# Patient Record
Sex: FEMALE | Race: ASIAN | NOT HISPANIC OR LATINO | URBAN - METROPOLITAN AREA
[De-identification: names, ages, dates, MRNs, and addresses within clinical notes are randomized per-mention and may not be internally consistent; named-entity substitution may affect disease eponyms.]

---

## 2019-04-28 ENCOUNTER — EMERGENCY (EMERGENCY)
Facility: HOSPITAL | Age: 64
LOS: 1 days | Discharge: HOME | End: 2019-04-28
Attending: EMERGENCY MEDICINE | Admitting: EMERGENCY MEDICINE
Payer: MEDICAID

## 2019-04-28 VITALS
DIASTOLIC BLOOD PRESSURE: 73 MMHG | SYSTOLIC BLOOD PRESSURE: 130 MMHG | RESPIRATION RATE: 18 BRPM | HEART RATE: 64 BPM | OXYGEN SATURATION: 100 % | TEMPERATURE: 98 F

## 2019-04-28 LAB
ALBUMIN SERPL ELPH-MCNC: 4.1 G/DL — SIGNIFICANT CHANGE UP (ref 3.3–5)
ALP SERPL-CCNC: 64 U/L — SIGNIFICANT CHANGE UP (ref 40–120)
ALT FLD-CCNC: 13 U/L — SIGNIFICANT CHANGE UP (ref 4–33)
AMPHET UR-MCNC: NEGATIVE — SIGNIFICANT CHANGE UP
ANION GAP SERPL CALC-SCNC: 13 MMO/L — SIGNIFICANT CHANGE UP (ref 7–14)
APAP SERPL-MCNC: < 15 UG/ML — LOW (ref 15–25)
APPEARANCE UR: CLEAR — SIGNIFICANT CHANGE UP
AST SERPL-CCNC: 15 U/L — SIGNIFICANT CHANGE UP (ref 4–32)
BARBITURATES UR SCN-MCNC: NEGATIVE — SIGNIFICANT CHANGE UP
BASOPHILS # BLD AUTO: 0.02 K/UL — SIGNIFICANT CHANGE UP (ref 0–0.2)
BASOPHILS NFR BLD AUTO: 0.3 % — SIGNIFICANT CHANGE UP (ref 0–2)
BENZODIAZ UR-MCNC: NEGATIVE — SIGNIFICANT CHANGE UP
BILIRUB SERPL-MCNC: 0.4 MG/DL — SIGNIFICANT CHANGE UP (ref 0.2–1.2)
BILIRUB UR-MCNC: NEGATIVE — SIGNIFICANT CHANGE UP
BLOOD UR QL VISUAL: NEGATIVE — SIGNIFICANT CHANGE UP
BUN SERPL-MCNC: 9 MG/DL — SIGNIFICANT CHANGE UP (ref 7–23)
CALCIUM SERPL-MCNC: 9.5 MG/DL — SIGNIFICANT CHANGE UP (ref 8.4–10.5)
CANNABINOIDS UR-MCNC: NEGATIVE — SIGNIFICANT CHANGE UP
CHLORIDE SERPL-SCNC: 102 MMOL/L — SIGNIFICANT CHANGE UP (ref 98–107)
CO2 SERPL-SCNC: 24 MMOL/L — SIGNIFICANT CHANGE UP (ref 22–31)
COCAINE METAB.OTHER UR-MCNC: NEGATIVE — SIGNIFICANT CHANGE UP
COLOR SPEC: SIGNIFICANT CHANGE UP
CREAT SERPL-MCNC: 0.62 MG/DL — SIGNIFICANT CHANGE UP (ref 0.5–1.3)
EOSINOPHIL # BLD AUTO: 0.07 K/UL — SIGNIFICANT CHANGE UP (ref 0–0.5)
EOSINOPHIL NFR BLD AUTO: 1.1 % — SIGNIFICANT CHANGE UP (ref 0–6)
ETHANOL BLD-MCNC: < 10 MG/DL — SIGNIFICANT CHANGE UP
GLUCOSE SERPL-MCNC: 96 MG/DL — SIGNIFICANT CHANGE UP (ref 70–99)
GLUCOSE UR-MCNC: NEGATIVE — SIGNIFICANT CHANGE UP
HCT VFR BLD CALC: 41.5 % — SIGNIFICANT CHANGE UP (ref 34.5–45)
HGB BLD-MCNC: 13.3 G/DL — SIGNIFICANT CHANGE UP (ref 11.5–15.5)
IMM GRANULOCYTES NFR BLD AUTO: 0.3 % — SIGNIFICANT CHANGE UP (ref 0–1.5)
KETONES UR-MCNC: NEGATIVE — SIGNIFICANT CHANGE UP
LEUKOCYTE ESTERASE UR-ACNC: NEGATIVE — SIGNIFICANT CHANGE UP
LIDOCAIN IGE QN: 43.9 U/L — SIGNIFICANT CHANGE UP (ref 7–60)
LYMPHOCYTES # BLD AUTO: 2.37 K/UL — SIGNIFICANT CHANGE UP (ref 1–3.3)
LYMPHOCYTES # BLD AUTO: 38.4 % — SIGNIFICANT CHANGE UP (ref 13–44)
MCHC RBC-ENTMCNC: 29.3 PG — SIGNIFICANT CHANGE UP (ref 27–34)
MCHC RBC-ENTMCNC: 32 % — SIGNIFICANT CHANGE UP (ref 32–36)
MCV RBC AUTO: 91.4 FL — SIGNIFICANT CHANGE UP (ref 80–100)
METHADONE UR-MCNC: NEGATIVE — SIGNIFICANT CHANGE UP
MONOCYTES # BLD AUTO: 0.52 K/UL — SIGNIFICANT CHANGE UP (ref 0–0.9)
MONOCYTES NFR BLD AUTO: 8.4 % — SIGNIFICANT CHANGE UP (ref 2–14)
NEUTROPHILS # BLD AUTO: 3.17 K/UL — SIGNIFICANT CHANGE UP (ref 1.8–7.4)
NEUTROPHILS NFR BLD AUTO: 51.5 % — SIGNIFICANT CHANGE UP (ref 43–77)
NITRITE UR-MCNC: NEGATIVE — SIGNIFICANT CHANGE UP
NRBC # FLD: 0 K/UL — SIGNIFICANT CHANGE UP (ref 0–0)
OPIATES UR-MCNC: NEGATIVE — SIGNIFICANT CHANGE UP
OXYCODONE UR-MCNC: NEGATIVE — SIGNIFICANT CHANGE UP
PCP UR-MCNC: NEGATIVE — SIGNIFICANT CHANGE UP
PH UR: 7 — SIGNIFICANT CHANGE UP (ref 5–8)
PLATELET # BLD AUTO: 391 K/UL — SIGNIFICANT CHANGE UP (ref 150–400)
PMV BLD: 9.6 FL — SIGNIFICANT CHANGE UP (ref 7–13)
POTASSIUM SERPL-MCNC: 3.8 MMOL/L — SIGNIFICANT CHANGE UP (ref 3.5–5.3)
POTASSIUM SERPL-SCNC: 3.8 MMOL/L — SIGNIFICANT CHANGE UP (ref 3.5–5.3)
PROT SERPL-MCNC: 7.3 G/DL — SIGNIFICANT CHANGE UP (ref 6–8.3)
PROT UR-MCNC: NEGATIVE — SIGNIFICANT CHANGE UP
RBC # BLD: 4.54 M/UL — SIGNIFICANT CHANGE UP (ref 3.8–5.2)
RBC # FLD: 12.9 % — SIGNIFICANT CHANGE UP (ref 10.3–14.5)
SALICYLATES SERPL-MCNC: < 5 MG/DL — LOW (ref 15–30)
SODIUM SERPL-SCNC: 139 MMOL/L — SIGNIFICANT CHANGE UP (ref 135–145)
SP GR SPEC: 1.01 — SIGNIFICANT CHANGE UP (ref 1–1.04)
TSH SERPL-MCNC: 1.21 UIU/ML — SIGNIFICANT CHANGE UP (ref 0.27–4.2)
UROBILINOGEN FLD QL: NORMAL — SIGNIFICANT CHANGE UP
WBC # BLD: 6.17 K/UL — SIGNIFICANT CHANGE UP (ref 3.8–10.5)
WBC # FLD AUTO: 6.17 K/UL — SIGNIFICANT CHANGE UP (ref 3.8–10.5)

## 2019-04-28 PROCEDURE — 74177 CT ABD & PELVIS W/CONTRAST: CPT | Mod: 26

## 2019-04-28 PROCEDURE — 99284 EMERGENCY DEPT VISIT MOD MDM: CPT

## 2019-04-28 RX ORDER — MECLIZINE HCL 12.5 MG
25 TABLET ORAL ONCE
Qty: 0 | Refills: 0 | Status: COMPLETED | OUTPATIENT
Start: 2019-04-28 | End: 2019-04-28

## 2019-04-28 RX ORDER — SODIUM CHLORIDE 9 MG/ML
1000 INJECTION INTRAMUSCULAR; INTRAVENOUS; SUBCUTANEOUS ONCE
Qty: 0 | Refills: 0 | Status: COMPLETED | OUTPATIENT
Start: 2019-04-28 | End: 2019-04-28

## 2019-04-28 RX ORDER — METOCLOPRAMIDE HCL 10 MG
10 TABLET ORAL ONCE
Qty: 0 | Refills: 0 | Status: COMPLETED | OUTPATIENT
Start: 2019-04-28 | End: 2019-04-28

## 2019-04-28 RX ORDER — LANOLIN ALCOHOL/MO/W.PET/CERES
1 CREAM (GRAM) TOPICAL
Qty: 14 | Refills: 0 | OUTPATIENT
Start: 2019-04-28 | End: 2019-05-11

## 2019-04-28 RX ORDER — ONDANSETRON 8 MG/1
4 TABLET, FILM COATED ORAL ONCE
Qty: 0 | Refills: 0 | Status: COMPLETED | OUTPATIENT
Start: 2019-04-28 | End: 2019-04-28

## 2019-04-28 RX ORDER — MECLIZINE HCL 12.5 MG
1 TABLET ORAL
Qty: 21 | Refills: 0 | OUTPATIENT
Start: 2019-04-28 | End: 2019-05-04

## 2019-04-28 RX ORDER — METOCLOPRAMIDE HCL 10 MG
1 TABLET ORAL
Qty: 28 | Refills: 0 | OUTPATIENT
Start: 2019-04-28 | End: 2019-05-04

## 2019-04-28 RX ADMIN — ONDANSETRON 4 MILLIGRAM(S): 8 TABLET, FILM COATED ORAL at 18:20

## 2019-04-28 RX ADMIN — SODIUM CHLORIDE 2000 MILLILITER(S): 9 INJECTION INTRAMUSCULAR; INTRAVENOUS; SUBCUTANEOUS at 18:04

## 2019-04-28 RX ADMIN — SODIUM CHLORIDE 1000 MILLILITER(S): 9 INJECTION INTRAMUSCULAR; INTRAVENOUS; SUBCUTANEOUS at 19:21

## 2019-04-28 RX ADMIN — Medication 25 MILLIGRAM(S): at 18:03

## 2019-04-28 RX ADMIN — Medication 10 MILLIGRAM(S): at 20:43

## 2019-04-28 NOTE — ED PROVIDER NOTE - PROGRESS NOTE DETAILS
Zhang att: Patient re-evaluated, dizziness abated with meclizine, nausea not improved. Reglan written. Zafar reports xavier-umbilical abd pain. CT ordered. Daughter Cheryle updated. Zhang att: Nausea improved, patient now declines CT, prefers home and see PMD. Dc papers below. Kaitlin: patient changed her mind after speaking with her daughter, will stay for CT a/p. reordered. Kaitlin: CT with no acute findings. will dc home.

## 2019-04-28 NOTE — ED PROVIDER NOTE - CLINICAL SUMMARY MEDICAL DECISION MAKING FREE TEXT BOX
63y female presenting with insomnia, nausea and dizziness.  DDx: anemia, electrolyte abnormality, BPPV.  No focal neuro deficits do not think stroke.  Will get labs, give fluids, meclezine and zofran. 63y female presenting with insomnia, nausea and dizziness.  DDx: anemia, electrolyte abnormality, BPPV.  No focal neuro deficits do not think stroke.  Will get labs, give fluids, meclezine and zofran.  (1) Dizziness and nausea with intact cerebellar and cranial nerve exam DDX bppv PLAN meclizine, zofran, orthostatics, fluid cbc r/o anemia  (2) Insomnia eval for thyroid dysfunction, dc melatonin or benadryl and referral to crisis center

## 2019-04-28 NOTE — ED ADULT NURSE NOTE - NSIMPLEMENTINTERV_GEN_ALL_ED
Implemented All Fall Risk Interventions:  Ida to call system. Call bell, personal items and telephone within reach. Instruct patient to call for assistance. Room bathroom lighting operational. Non-slip footwear when patient is off stretcher. Physically safe environment: no spills, clutter or unnecessary equipment. Stretcher in lowest position, wheels locked, appropriate side rails in place. Provide visual cue, wrist band, yellow gown, etc. Monitor gait and stability. Monitor for mental status changes and reorient to person, place, and time. Review medications for side effects contributing to fall risk. Reinforce activity limits and safety measures with patient and family.

## 2019-04-28 NOTE — ED PROVIDER NOTE - OBJECTIVE STATEMENT
17:22 Zhang att: 63F brought in by family for insomnia x 2-2.5 wks. Past 2.5 weeks patient having trouble sleeping, less appetite, less energy. At times feels both lightheaded, room spinning, and nausea. Notes slight cough, no dysuria. [place] ago [place] At present notes dry spit  PMH head trauma 2 years ago [facility] MED ambien 5 mg qhs (no help) ALL SMOKE PCP 17:22 Zhang att: [place]  #[id number] 63F brought in by family for insomnia x 2-2.5 wks. Past 2.5 weeks while in [place] patient having trouble sleeping, less appetite, less energy. At times feels both lightheaded, room spinning, and nausea. Notes slight cough, no dysuria. Denies visual disturbance, aphasia, dysphagia, ataxia, or focal motor or sensory disturbance of face arm or leg. Nine days ago moved from [place] to [facility]. [place]CP prescribed Ambien 5 mg po qhs but the pills don't help with sleep, make her nauseous, and she wakes up foggy and dry mouth. Denies si/hi/ah. PMH head trauma 2 years ago P[facility] MED ambien 5 mg qhs (no help) ALL SMOKE PCP

## 2019-04-28 NOTE — ED ADULT TRIAGE NOTE - CHIEF COMPLAINT QUOTE
PT C/O Dizziness, Nausea, inability to sleep for approximately 1.5 weeks. Pt returned from [place] 9 days ago states symptoms were ongoing while in [place]. Denies fevers, chills, cough, recent illness, PMH. Swedish  used for HPI. EKG in progress.

## 2019-04-28 NOTE — ED PROVIDER NOTE - NEUROLOGICAL, MLM
Alert and oriented, no focal deficits, no motor or sensory deficits. Coordination with finger to nose intact, heel to shin intact bilaterally.  No gait abnormalities

## 2019-04-28 NOTE — ED ADULT NURSE REASSESSMENT NOTE - NS ED NURSE REASSESS COMMENT FT1
returned from break coverage, pt alert and speaking with family, orthostatic completed, denies dizziness, n/v/cp

## 2019-04-28 NOTE — ED PROVIDER NOTE - ATTENDING CONTRIBUTION TO CARE
Dr. Holcomb: I have personally seen and examined this patient at the bedside. I have fully participated in the care of this patient. I have reviewed all pertinent clinical information, including history, physical exam, plan and the Resident's note and agree except as noted. HPI above as by me. PE above as by me. (1) Dizziness and nausea with intact cerebellar and cranial nerve exam DDX bppv PLAN meclizine, zofran, orthostatics, fluid cbc r/o anemia (2) Insomnia eval for thyroid dysfunction, dc melatonin or benadryl and referral to crisis center

## 2019-04-28 NOTE — ED PROVIDER NOTE - PHYSICAL EXAMINATION
Zhang att: nad, aaox3, ctabl, rrr, s1s2, abd soft ntnd, neg le edema. CN 2-12 intact, neg pronator drift, 5/5 str throughout, sensation intact throughout, gait steady, clear speech. Neg nystagmus. Nl finger nose. Nl rapid alt movement. Nl heel shin. Nl gait. Neg romberg.

## 2019-04-28 NOTE — ED ADULT NURSE NOTE - CHIEF COMPLAINT QUOTE
PT C/O Dizziness, Nausea, inability to sleep for approximately 1.5 weeks. Pt returned from [place] 9 days ago states symptoms were ongoing while in [place]. Denies fevers, chills, cough, recent illness, PMH. Albanian  used for HPI. EKG in progress.

## 2019-04-28 NOTE — ED PROVIDER NOTE - NSFOLLOWUPINSTRUCTIONS_ED_ALL_ED_FT
Seen in ER for insomnia, nausea, and dizziness. (1) For sleeping trouble. please stop ambien. For the insomnia (inability to sleep) please take Benadryl 25-50 mg at night OR Melatonin 3-5 mg at night to help you fall and stay asleep. Benadryl may make you more dry mouth and cause morning drowsiness like the pill you used to take. Melatonin is natural and produced in the body but may cause nightmare. (2) For your nausea please take Reglan 1 tab every 6 hours as needed for nausea. See your primary doctor for further follow up care. (3) If you are dizzy please take Meclizine 25 mg one tab every 8 hours as needed. Return to ER for any new or worsening symptoms.

## 2019-04-28 NOTE — ED ADULT NURSE REASSESSMENT NOTE - NS ED NURSE REASSESS COMMENT FT1
Received report from mayito RN Ally CHURCHILL. Pt AOx4, ambulatory, pt c/o dizziness at this time, pt medicated as ordered, vitals as charted, breathing even and unlabored, waiting on CT, will continue to monitor.

## 2019-04-28 NOTE — ED ADULT NURSE NOTE - OBJECTIVE STATEMENT
Pt aa&ox3 primarily Faroese speaking ( phone used) [place]  #[id number] p/w insomnia x 2-2.5 wks. Pt states while in [place] for 2 weeks she was having trouble sleeping, less appetite, less energy. Pt states at times she feels both lightheaded, room spinning, and nausea. Notes slight cough, no dysuria. Denies visual disturbance, aphasia, dysphagia, ataxia, or focal motor or sensory disturbance of face arm or leg. [place]CP prescribed Ambien 5 mg po qhs but the pills don't help with sleep, make her nauseous, and she wakes up foggy and dry mouth. Denies si/hi/ah. 20g IV placed in Rt AC, labs sent. Will monitor.

## 2019-04-29 VITALS
HEART RATE: 65 BPM | DIASTOLIC BLOOD PRESSURE: 64 MMHG | TEMPERATURE: 98 F | RESPIRATION RATE: 18 BRPM | OXYGEN SATURATION: 97 % | SYSTOLIC BLOOD PRESSURE: 125 MMHG

## 2019-04-30 LAB
BACTERIA UR CULT: SIGNIFICANT CHANGE UP
SPECIMEN SOURCE: SIGNIFICANT CHANGE UP

## 2022-02-11 PROBLEM — Z00.00 ENCOUNTER FOR PREVENTIVE HEALTH EXAMINATION: Status: ACTIVE | Noted: 2022-02-11

## 2022-02-18 ENCOUNTER — NON-APPOINTMENT (OUTPATIENT)
Age: 67
End: 2022-02-18

## 2022-02-18 ENCOUNTER — APPOINTMENT (OUTPATIENT)
Dept: ORTHOPEDIC SURGERY | Facility: CLINIC | Age: 67
End: 2022-02-18
Payer: MEDICARE

## 2022-02-18 VITALS
WEIGHT: 110 LBS | BODY MASS INDEX: 19.49 KG/M2 | HEIGHT: 62.99 IN | DIASTOLIC BLOOD PRESSURE: 71 MMHG | SYSTOLIC BLOOD PRESSURE: 107 MMHG | OXYGEN SATURATION: 95 % | HEART RATE: 79 BPM

## 2022-02-18 PROCEDURE — 99204 OFFICE O/P NEW MOD 45 MIN: CPT

## 2022-02-18 PROCEDURE — 73030 X-RAY EXAM OF SHOULDER: CPT | Mod: RT

## 2022-02-18 NOTE — PHYSICAL EXAM
[de-identified] : Physical Examination\par General: well nourished, in no acute distress, alert and oriented to person, place and time\par Psychiatric: normal mood and affect, no abnormal movements or speech patterns\par Eyes: vision intact [-] glasses\par \par Musculoskeletal Examination\par Cervical spine	Full painless range of motion and negative Spurling's test\par \par Shoulder			Right			Left\par Appearance\par      Skin/Swelling/Deformity	normal			normal\par      Scapular Winging		-			-\par Range of Motion\par      Forward Flexion		80 / 80		170 / 170\par      Abduction			60 / 60		170 / 170\par      External Rotation		15			80\par      Internal Rotation		lateral hip			T10\par      SAbd Ext Rotation		90&			90\par      SAbd Int Rotation		80&			80\par      Painful Arc			+			-\par      Crepitus			-			-\par Palpation\par      Clavicle			-			-\par      AC Joint			-			-\par      Posterior Acromion		-			-\par      Levator Scapula		-			-\par      Lateral Bursa			+			-\par      Impingement Area		-			-\par      Biceps Tendon		-			-\par      Anterior Capsule		-			-\par Strength Examination\par      Supraspinatous 		5+ / +			5+ / 0\par      Infraspinatous			5+ / +			5+ / 0\par      Subscapularis			5+ / 0			5+ / 0\par      Belly Press			5+ / 0			5+ / 0\par      Lift Off			-&			-\par      Drop-Arm			-			-\par Special Examination\par      Biceps Dragoon's		+			-\par      Impingement Neer		-			-\par      Impingement Hawking		-			-\par \par Sensation\par      Axillary			normal			normal\par      LatAntCubBrach 		normal			normal\par      Median 			normal			normal\par      Ulnar 			normal			normal\par      Radial 			normal			normal\par Motor\par      AIN 				normal			normal\par      Ulnar 			normal			normal\par      Radial 			normal			normal\par      PIN 				normal			normal\par Pulses\par      Radial			2+			2+ [de-identified] : 4 views of the affected Right shoulder (AP, Glenoid, Y-View, Axillary)\par were ordered, obtained and evaluated by myself today and\par demonstrate:\par normal bony calcification without dislocation and no fracture\par 	Arch	2B\par 	AC Joint	no Arthrosis\par 	GH Joint	no Arthrosis\par 	Calcifications	none\par \par MRI right shoulder from [facility] on 2-14-22\par My impression of the images:\par Quality of the MRI is ok\par Supraspinatous Tendon low grade undersurface tear with tendinosis\par Infraspinatous Tendon tendinosis\par Subscapularis Tendon ok\par Teres Minor Tendon ok\par Muscle Belly Atrophy none\par Biceps Tendon is  in the groove and looks ok intra-articularly but poorly visualized with a stable attachment anchor\par Superior Labrum ok\par Anterior Labrum ok\par Posterior Labrum ok\par AC joint ok\par There is no full thickness chondral lesion of the glenoid and humeral head\par \par The Final Radiologist Impression:\par Partial-thickness undersurface tearing of the supraspinatus tendon involving greater than 50% of the tendon thickness mild infraspinatus tendinosis\par \par Thickening and increased signal in the anterior joint capsule and edema within the rotator cuff interval, finding suggestive of adhesive capsulitis\par A.c. joint arthrosis\par

## 2022-02-18 NOTE — HISTORY OF PRESENT ILLNESS
[de-identified] : Chief Complaint R shoulder pain\par \par HPI: JOSE EDUARDO The patient is a 66 year yo RHD female presents today for 2 months constant pain to the anterior right shoulder after reaching for overhead item and after shoveling snow. \par  she says the pain is worse, rating the pain a 9 out of 10, and describes the character of the pain as sharp, stabbing, without radiation.\par she reports associated symptoms of loss of motion, stiffness. \par she reports nothing makes the pain better and internal rotation, overhead movements makes the pain worse. \par she report pain affecting sleep at night and she denies report prior similar pain.\par \par JOSE EDUARDO has tried the following treatments:\par Activity Modification	+\par Ice			-\par NSAIDs			+\par Physical Therapy		+many sessions; minor help\par Cortisone Injection		+2 csi injections; last one 2 wks ago, no help at all\par Arthroscopy		-\par \par \par Consult by: Charbel Dockery

## 2022-02-18 NOTE — DISCUSSION/SUMMARY
[de-identified] : Right shoulder adhesive capsulitis\par Right shoulder low-grade undersurface articular sided tear\par \par \par I discussed the etiology, pathology and expected natural course of the frozen shoulder with sudden with minimal trauma onset of worsening pain in the shoulder and progressive loss of range of motion. This is a result of a sudden inflammatory response within the capsule of the joint which stimulates inflammation and severe pain within the shoulder as experienced by the patient and causes thickening of the joint capsule. This inflammatory thickening of the joint capsule will progress to worsening loss of range of motion of the shoulder with continued worsening of the inflammatory pain. Eventually the inflammatory process burns itself out and inflammation resolves and the pain improves.  However the shoulder remains "frozen" with decreased range of motion due to the scarred and thickened joint capsule. With with time and physical therapy and motion of the shoulder the thickened and scarred joint capsule will begin to loosen and the patient will feel continued improvement of the symptoms in the shoulder with improved motion over time.\par \par The entire course of this disease can take 1-2 years to fully resolve in patients without diabetes. In patients with diabetes is problem can take 2-3 years to resolve with more severe pain and loss of motion in the interim and less complete resolution of pain and loss of motion at the end of the disease process.\par \par The patient was prescribed Diclofenac PO non-steroidal anti-inflammatory medication. 50mg tablets twice daily to be taken for at least 1-2 weeks in a row and then PRN afterwards. Risks and benefits were discussed and include but not limited to renal damage and GI ulceration and bleeding.  They were advised to take with food to limit stomach upset as well as warned to stop the medication if worsening gastric pain or dizziness or other side effects. Also to immediately stop the medication and seek appropriate medical attention if any severe stomach ache, gastritis, black/red vomit, black/red stools or any other medical concern.\par \par The treatment consists largely of non-operative management including, patient education, activity modification, maintaining ROM of the shoulder w exercises and physical therapy, addressing the inflammation with anti-inflammatory medications consisting of oral non-steroidal anti-inflammatory medications and an intra-articular/subacromial bursal sorticosteroid injection.\par \par Physical therapy prescription was provided\par \par declined csi\par \par The patient verifies their understanding the the visit, diagnosis and plan. They agree with the treatment plan and will contact the office with any questions or problems.\par \par Followup p.r.n.

## 2022-07-01 ENCOUNTER — APPOINTMENT (OUTPATIENT)
Dept: ORTHOPEDIC SURGERY | Facility: CLINIC | Age: 67
End: 2022-07-01

## 2022-07-01 DIAGNOSIS — M75.01 ADHESIVE CAPSULITIS OF RIGHT SHOULDER: ICD-10-CM

## 2022-07-01 DIAGNOSIS — M75.111 INCOMPLETE ROTATOR CUFF TEAR OR RUPTURE OF RIGHT SHOULDER, NOT SPECIFIED AS TRAUMATIC: ICD-10-CM

## 2022-07-01 PROCEDURE — 99213 OFFICE O/P EST LOW 20 MIN: CPT

## 2022-07-01 NOTE — DISCUSSION/SUMMARY
[de-identified] : Right shoulder adhesive capsulitis\par Right shoulder low-grade undersurface articular sided tear\par \par \par I discussed the etiology, pathology and expected natural course of the frozen shoulder with sudden with minimal trauma onset of worsening pain in the shoulder and progressive loss of range of motion. This is a result of a sudden inflammatory response within the capsule of the joint which stimulates inflammation and severe pain within the shoulder as experienced by the patient and causes thickening of the joint capsule. This inflammatory thickening of the joint capsule will progress to worsening loss of range of motion of the shoulder with continued worsening of the inflammatory pain. Eventually the inflammatory process burns itself out and inflammation resolves and the pain improves.  However the shoulder remains "frozen" with decreased range of motion due to the scarred and thickened joint capsule. With with time and physical therapy and motion of the shoulder the thickened and scarred joint capsule will begin to loosen and the patient will feel continued improvement of the symptoms in the shoulder with improved motion over time.\par \par The entire course of this disease can take 1-2 years to fully resolve in patients without diabetes. In patients with diabetes is problem can take 2-3 years to resolve with more severe pain and loss of motion in the interim and less complete resolution of pain and loss of motion at the end of the disease process.\par \par \par getting better continue nonop management\par \par \par \par The patient was prescribed Diclofenac PO non-steroidal anti-inflammatory medication. 50mg tablets twice daily to be taken for at least 1-2 weeks in a row and then PRN afterwards. Risks and benefits were discussed and include but not limited to renal damage and GI ulceration and bleeding.  They were advised to take with food to limit stomach upset as well as warned to stop the medication if worsening gastric pain or dizziness or other side effects. Also to immediately stop the medication and seek appropriate medical attention if any severe stomach ache, gastritis, black/red vomit, black/red stools or any other medical concern.\par \par \par Physical therapy prescription was provided\par \par \par The patient verifies their understanding the the visit, diagnosis and plan. They agree with the treatment plan and will contact the office with any questions or problems.\par \par Followup p.r.n.

## 2022-07-01 NOTE — PHYSICAL EXAM
[de-identified] : Physical Examination\par General: well nourished, in no acute distress, alert and oriented to person, place and time\par Psychiatric: normal mood and affect, no abnormal movements or speech patterns\par Eyes: vision intact [-] glasses\par \par Musculoskeletal Examination\par Cervical spine	Full painless range of motion and negative Spurling's test\par \par Shoulder			Right			Left\par Appearance\par      Skin/Swelling/Deformity	normal			normal\par      Scapular Winging		-			-\par Range of Motion\par      Forward Flexion		140 / 140		170 / 170\par      Abduction			80 / 80		170 / 170\par      External Rotation		45			80\par      Internal Rotation		L5			T10\par      SAbd Ext Rotation		90&			90\par      SAbd Int Rotation		80&			80\par      Painful Arc			+			-\par      Crepitus			-			-\par Palpation\par      Clavicle			-			-\par      AC Joint			-			-\par      Posterior Acromion		-			-\par      Levator Scapula		-			-\par      Lateral Bursa			+			-\par      Impingement Area		-			-\par      Biceps Tendon		-			-\par      Anterior Capsule		-			-\par Strength Examination\par      Supraspinatous 		5+ / +			5+ / 0\par      Infraspinatous			5+ / +			5+ / 0\par      Subscapularis			5+ / 0			5+ / 0\par      Belly Press			5+ / 0			5+ / 0\par      Lift Off			-&			-\par      Drop-Arm			-			-\par Special Examination\par      Biceps [place]'s		+			-\par      Impingement Neer		-			-\par      Impingement Hawking		-			-\par \par Sensation\par      Axillary			normal			normal\par      LatAntCubBrach 		normal			normal\par      Median 			normal			normal\par      Ulnar 			normal			normal\par      Radial 			normal			normal\par Motor\par      AIN 				normal			normal\par      Ulnar 			normal			normal\par      Radial 			normal			normal\par      PIN 				normal			normal\par Pulses\par      Radial			2+			2+ [de-identified] : 4 views of the affected Right shoulder (AP, Glenoid, Y-View, Axillary)\par 3-7-22\par demonstrate:\par normal bony calcification without dislocation and no fracture\par 	Arch	2B\par 	AC Joint	no Arthrosis\par 	GH Joint	no Arthrosis\par 	Calcifications	none\par \par MRI right shoulder from [facility] on 2-14-22\par My impression of the images:\par Quality of the MRI is ok\par Supraspinatous Tendon low grade undersurface tear with tendinosis\par Infraspinatous Tendon tendinosis\par Subscapularis Tendon ok\par Teres Minor Tendon ok\par Muscle Belly Atrophy none\par Biceps Tendon is  in the groove and looks ok intra-articularly but poorly visualized with a stable attachment anchor\par Superior Labrum ok\par Anterior Labrum ok\par Posterior Labrum ok\par AC joint ok\par There is no full thickness chondral lesion of the glenoid and humeral head\par \par The Final Radiologist Impression:\par Partial-thickness undersurface tearing of the supraspinatus tendon involving greater than 50% of the tendon thickness mild infraspinatus tendinosis\par \par Thickening and increased signal in the anterior joint capsule and edema within the rotator cuff interval, finding suggestive of adhesive capsulitis\par A.c. joint arthrosis\par

## 2022-07-01 NOTE — HISTORY OF PRESENT ILLNESS
[de-identified] : Chief Complaint R shoulder pain\par \par HPI: JOSE EDUARDO The patient is a 66 year yo RHD female presents today for 3 mo fu of  2 months constant pain to the anterior right shoulder after reaching for overhead item and after shoveling snow. \par  she says the pain is worse, rating the pain a 9 out of 10, and describes the character of the pain as sharp, stabbing, without radiation.\par she reports associated symptoms of loss of motion, stiffness. \par she reports nothing makes the pain better and internal rotation, overhead movements makes the pain worse. \par she report pain affecting sleep at night and she denies report prior similar pain.\par \par JOSE EDUARDO has tried the following treatments:\par Activity Modification	+\par Ice			-\par NSAIDs			+\par Physical Therapy		+many sessions; minor help\par Cortisone Injection		+2 csi injections no help at all\par Arthroscopy		-\par \par pain much better\par motion better\par pt very helpful last time 1 mo ago\par \par Consult by: Charbel Dockery

## 2024-02-12 ENCOUNTER — APPOINTMENT (OUTPATIENT)
Dept: VASCULAR SURGERY | Facility: CLINIC | Age: 69
End: 2024-02-12
Payer: MEDICARE

## 2024-02-12 VITALS
WEIGHT: 113 LBS | HEART RATE: 77 BPM | DIASTOLIC BLOOD PRESSURE: 58 MMHG | OXYGEN SATURATION: 98 % | HEIGHT: 63 IN | TEMPERATURE: 97.5 F | BODY MASS INDEX: 20.02 KG/M2 | SYSTOLIC BLOOD PRESSURE: 112 MMHG

## 2024-02-12 DIAGNOSIS — I83.891 VARICOSE VEINS OF RIGHT LOWER EXTREMITY WITH OTHER COMPLICATIONS: ICD-10-CM

## 2024-02-12 PROCEDURE — 99203 OFFICE O/P NEW LOW 30 MIN: CPT

## 2024-02-12 PROCEDURE — 93970 EXTREMITY STUDY: CPT

## 2024-03-05 RX ORDER — ALPRAZOLAM 0.5 MG/1
0.5 TABLET ORAL
Qty: 1 | Refills: 0 | Status: COMPLETED | COMMUNITY
Start: 2024-03-05 | End: 2024-03-12

## 2024-03-08 NOTE — ASSESSMENT
[FreeTextEntry1] : JOSE EDUARDO STONER is a 68 year old female presents for evaluation.   > Venous insufficiency, CEAP C2 - Reviewed results of duplex w/ patient. no dvt. evidence of superficial venous insufficiency with significant varicose veins.  - Discussed management options. Patient is a candidate for right stab phlbectomy given history of prior intervention and conservative management with persistent symptoms. Risks, benefits, alternatives discussed with the patient. All questions answered. The patient elected to proceed with procedure. Williamson earl patient.  - For symptom management, recommend use of compression stockings (20-30 mmhg, prescription given), leg elevation, and ambulation.

## 2024-03-08 NOTE — HISTORY OF PRESENT ILLNESS
[FreeTextEntry1] : JOSE EDUARDO STONER is a 68 year old female who presents for evaluation of varicose veins.   Referred by Dr. Kayode Dockery.   Patient states that she had right leg varicose veins for many years. She underwent a procedure for the veins five years ago, including a laser procedure, but had persistent varicose veins. She reports right leg pain at the end of the day.  Personal history of DVT - None Family history of DVT - None Family history of venous insufficiency or varicose veins - None Current compression stocking usage - Has in the past but could not tolerate due to tightness. Has 2 children Works as a nail salon so sits.   + PMH: Denies.  + PSH: As above + FH: Denies + SH: Never smoker, no etoh use, no illicit substance use + Aller: NKDA  PCP is Dr. Melisa Dockery.

## 2024-03-08 NOTE — PHYSICAL EXAM
[1+] : right 1+ [2+] : left 2+ [Varicose Veins Of Lower Extremities] : present [Varicose Veins Of The Right Leg] : of the right leg [Ankle Swelling Bilaterally] : severe [Calm] : calm [Ankle Swelling (On Exam)] : not present [] : not present [de-identified] : Well-appearing  [de-identified] : EOMI, anicteric  [de-identified] : soft, nt, nd  [de-identified] : motor and sensation intact in all four extremities  [de-identified] : no wounds on bilateral feet [de-identified] : A&Ox4

## 2024-03-08 NOTE — DATA REVIEWED
[FreeTextEntry1] : 2/12/2024 - Bilateral venous duplex Negative for DVT/SVT bilaterally. Deep venous reflux in bilateral legs Right - reflux in SSV at junction with evidence of prior intervention with varicose veins in proximal calf.

## 2024-03-12 ENCOUNTER — APPOINTMENT (OUTPATIENT)
Dept: VASCULAR SURGERY | Facility: CLINIC | Age: 69
End: 2024-03-12
Payer: MEDICARE

## 2024-03-12 PROCEDURE — 37765 STAB PHLEB VEINS XTR 10-20: CPT | Mod: RT

## 2024-03-12 NOTE — PROCEDURE
[FreeTextEntry1] : right stab phlebectomy   [FreeTextEntry3] : Procedural safety checklist and time out completed: Confirmed patient identification (Patient Name, , and/or medical record number including when possible affirmation by patient or parent/family/other. Confirmed procedure with the patient. Consent present, accurate and signed.  Confirmed special equipment and supplies are present. Sterility confirmed. Position verified.  Site/ side is marked and visible and confirmed.  Procedure confirmed by consent. Accurate consent including side and site. Review of medical records noting correct procedure including site and side. MD/PA verifies presence and review of imaging studies and or written report of imaging studies. Specify equipment are available for the planned procedure. MD/PA has marked the patient's procedural site and side. Agreement on the procedure to be performed Time out completed. All of the above has been confirmed by the team. All patient-specific concerns have been addressed.   Indication:  right lower extremity varicose veins with inflammation, leg pain, leg swelling, and leg cramping.    Procedure: Stab phlebectomy right lower extremity   Ms. JOSE EDUARDO STONER is a 68 year old F with a history of symptomatic right lower extremity varicose veins. A trial of compression stockings, exercise, elevation, and pain medication was attempted without relief and definitive treatment with microphlebectomy was offered.   I have discussed the risks of the procedure at length with the patient. The risks discussed were inclusive of but not limited to infection, irritation at the site of infiltration of local anesthesia, and also rare risk of deep venous thrombosis and pulmonary emboli. The patient agrees to proceed with the procedure.  The patient was escorted into the procedure room, the varicose veins for treatment were marked out and the patient placed on the examination table. The entire limb was prepped and draped in sterile fashion and a  time out was called.   Local anesthesia using _15 cc 1% lidocaine was infiltrated using a 25 gauge needle over the previously marked prominent varicose vein sites. Multiple small stab incisions, each less than 1 cm in length was made at the noted sites. With the help of a vein hook, the vein was fished out at each of these sites, rolled over a narrow-tipped mosquito clamp and removed. Hemostasis was secured with leg elevation and application of manual pressure. After assuring hemostasis, a sterile 4x4 was placed on the access sites and an ACE compression wrap was applied. Estimated blood loss: minimal. Patient tolerated procedure well. Patient was given post-procedure instructions and follow up appointment was scheduled.   SIDE - RIGHT  SITE - CALF  LOCATION - MEDIAL / POSTERIOR	 Total Stab Incisions 10-20

## 2024-04-01 ENCOUNTER — APPOINTMENT (OUTPATIENT)
Dept: VASCULAR SURGERY | Facility: CLINIC | Age: 69
End: 2024-04-01
Payer: MEDICARE

## 2024-04-01 VITALS
DIASTOLIC BLOOD PRESSURE: 60 MMHG | HEART RATE: 81 BPM | SYSTOLIC BLOOD PRESSURE: 108 MMHG | RESPIRATION RATE: 17 BRPM | WEIGHT: 113 LBS | TEMPERATURE: 97.8 F | HEIGHT: 63 IN | OXYGEN SATURATION: 98 % | BODY MASS INDEX: 20.02 KG/M2

## 2024-04-01 DIAGNOSIS — I83.893 VARICOSE VEINS OF BILATERAL LOWER EXTREMITIES WITH OTHER COMPLICATIONS: ICD-10-CM

## 2024-04-01 PROCEDURE — 99213 OFFICE O/P EST LOW 20 MIN: CPT

## 2024-04-01 RX ORDER — DICLOFENAC SODIUM 50 MG/1
50 TABLET, DELAYED RELEASE ORAL
Qty: 60 | Refills: 1 | Status: DISCONTINUED | COMMUNITY
Start: 2022-02-18 | End: 2024-04-01

## 2024-04-01 RX ORDER — DICLOFENAC SODIUM 50 MG/1
50 TABLET, DELAYED RELEASE ORAL
Qty: 60 | Refills: 1 | Status: DISCONTINUED | COMMUNITY
Start: 2022-07-01 | End: 2024-04-01

## 2024-04-01 NOTE — ASSESSMENT
[FreeTextEntry1] : JOSE EDUARDO STONER is a 68 year old female presents for evaluation.   > Venous insufficiency, CEAP C2 - s/p right knee stab phlebectomy - Incisions healing well. Area of phlebitis noted on posterior knee fossa. Advised use of warm compresses.  - Discussed importance of sun exposure avoidance for scar healing.  - Follow up as needed.

## 2024-04-01 NOTE — HISTORY OF PRESENT ILLNESS
[de-identified] : 3/12/2024 - Right stab phlebectomy  4/1/2024 - [facility]  #[id number]. Pt presents for follow up. Reports continued mild right posterior calf pain. Incisions healing well.  [FreeTextEntry1] : JOSE EDUARDO STONER is a 68 year old female who presents for evaluation of varicose veins.   Referred by Dr. Kayode Dockery.   Patient states that she had right leg varicose veins for many years. She underwent a procedure for the veins five years ago, including a laser procedure, but had persistent varicose veins. She reports right leg pain at the end of the day.  Personal history of DVT - None Family history of DVT - None Family history of venous insufficiency or varicose veins - None Current compression stocking usage - Has in the past but could not tolerate due to tightness. Has 2 children Works as a nail salon so sits.   + PMH: Denies.  + PSH: As above + FH: Denies + SH: Never smoker, no etoh use, no illicit substance use + Aller: NKDA  PCP is Dr. Melisa Dockery.

## 2024-04-01 NOTE — PHYSICAL EXAM
[Varicose Veins Of The Right Leg] : of the right leg [Ankle Swelling Bilaterally] : severe [Calm] : calm [Ankle Swelling (On Exam)] : not present [Varicose Veins Of Lower Extremities] : not present [] : not present [de-identified] : Well-appearing  [de-identified] : EOMI, anicteric  [de-identified] : soft, nt, nd  [de-identified] : motor and sensation intact in all four extremities  [de-identified] : incisions healing well on right posterior calf. Small area of phlebitis on posterior knee fossa.  [de-identified] : A&Ox4